# Patient Record
Sex: FEMALE | Race: WHITE | Employment: FULL TIME | URBAN - NONMETROPOLITAN AREA
[De-identification: names, ages, dates, MRNs, and addresses within clinical notes are randomized per-mention and may not be internally consistent; named-entity substitution may affect disease eponyms.]

---

## 2021-05-24 ENCOUNTER — OFFICE VISIT (OUTPATIENT)
Dept: FAMILY MEDICINE CLINIC | Facility: CLINIC | Age: 31
End: 2021-05-24
Payer: COMMERCIAL

## 2021-05-24 VITALS
HEIGHT: 66 IN | OXYGEN SATURATION: 96 % | TEMPERATURE: 98 F | HEART RATE: 82 BPM | DIASTOLIC BLOOD PRESSURE: 72 MMHG | BODY MASS INDEX: 30.22 KG/M2 | SYSTOLIC BLOOD PRESSURE: 118 MMHG | WEIGHT: 188 LBS

## 2021-05-24 DIAGNOSIS — M25.562 CHRONIC PAIN OF BOTH KNEES: ICD-10-CM

## 2021-05-24 DIAGNOSIS — G44.209 TENSION HEADACHE: ICD-10-CM

## 2021-05-24 DIAGNOSIS — Z12.31 BREAST CANCER SCREENING BY MAMMOGRAM: ICD-10-CM

## 2021-05-24 DIAGNOSIS — Z97.5 IUD (INTRAUTERINE DEVICE) IN PLACE: ICD-10-CM

## 2021-05-24 DIAGNOSIS — G89.29 CHRONIC PAIN OF BOTH KNEES: ICD-10-CM

## 2021-05-24 DIAGNOSIS — M25.561 CHRONIC PAIN OF BOTH KNEES: ICD-10-CM

## 2021-05-24 DIAGNOSIS — Z01.419 ENCOUNTER FOR ANNUAL ROUTINE GYNECOLOGICAL EXAMINATION: Primary | ICD-10-CM

## 2021-05-24 DIAGNOSIS — Z86.16 HISTORY OF 2019 NOVEL CORONAVIRUS DISEASE (COVID-19): ICD-10-CM

## 2021-05-24 DIAGNOSIS — J01.40 ACUTE NON-RECURRENT PANSINUSITIS: ICD-10-CM

## 2021-05-24 DIAGNOSIS — Z00.00 GENERAL MEDICAL EXAM: ICD-10-CM

## 2021-05-24 PROCEDURE — 3074F SYST BP LT 130 MM HG: CPT | Performed by: NURSE PRACTITIONER

## 2021-05-24 PROCEDURE — 3078F DIAST BP <80 MM HG: CPT | Performed by: NURSE PRACTITIONER

## 2021-05-24 PROCEDURE — 99385 PREV VISIT NEW AGE 18-39: CPT | Performed by: NURSE PRACTITIONER

## 2021-05-24 PROCEDURE — 87624 HPV HI-RISK TYP POOLED RSLT: CPT | Performed by: NURSE PRACTITIONER

## 2021-05-24 PROCEDURE — 3008F BODY MASS INDEX DOCD: CPT | Performed by: NURSE PRACTITIONER

## 2021-05-24 PROCEDURE — 88175 CYTOPATH C/V AUTO FLUID REDO: CPT | Performed by: NURSE PRACTITIONER

## 2021-05-24 PROCEDURE — 87625 HPV TYPES 16 & 18 ONLY: CPT | Performed by: NURSE PRACTITIONER

## 2021-05-24 RX ORDER — AZITHROMYCIN 250 MG/1
TABLET, FILM COATED ORAL
Qty: 6 TABLET | Refills: 0 | Status: SHIPPED | OUTPATIENT
Start: 2021-05-24 | End: 2021-05-29

## 2021-05-24 NOTE — PROGRESS NOTES
HPI: HPI   Patient is here to establish care and for physical. Overdue for pap. Has Paragard IUD. Regular, monthly periods. They are on the heavier side. She had COVID in November 2020. Has been experiencing daily tension headaches since that time.  Worse watches minimally, started Keto 1 week ago     REVIEW OF SYSTEMS:   Review of Systems   Constitutional: Negative for appetite change, chills, fatigue, fever and unexpected weight change.    HENT: Positive for congestion, postnasal drip, rhinorrhea, sinus pr rhinorrhea present. Rhinorrhea is purulent. Right Turbinates: Swollen. Left Turbinates: Swollen. Mouth/Throat:      Mouth: Mucous membranes are moist.      Pharynx: Oropharynx is clear.    Eyes:      Conjunctiva/sclera: Conjunctivae normal. XR KNEE ROUTINE (3 VIEWS), RIGHT (CPT=73562);  Future    Tension headache  -     NEURO - INTERNAL    History of 2019 novel coronavirus disease (COVID-19)  -     NEURO - INTERNAL

## 2021-05-25 ENCOUNTER — HOSPITAL ENCOUNTER (OUTPATIENT)
Dept: GENERAL RADIOLOGY | Age: 31
Discharge: HOME | End: 2021-05-25
Attending: NURSE PRACTITIONER
Payer: COMMERCIAL

## 2021-05-25 ENCOUNTER — LABORATORY ENCOUNTER (OUTPATIENT)
Dept: LAB | Age: 31
End: 2021-05-25
Attending: FAMILY MEDICINE
Payer: COMMERCIAL

## 2021-05-25 DIAGNOSIS — G89.29 CHRONIC PAIN OF BOTH KNEES: ICD-10-CM

## 2021-05-25 DIAGNOSIS — M25.562 CHRONIC PAIN OF BOTH KNEES: ICD-10-CM

## 2021-05-25 DIAGNOSIS — M25.561 CHRONIC PAIN OF BOTH KNEES: ICD-10-CM

## 2021-05-25 PROCEDURE — 73562 X-RAY EXAM OF KNEE 3: CPT | Performed by: NURSE PRACTITIONER

## 2021-07-26 ENCOUNTER — OFFICE VISIT (OUTPATIENT)
Dept: FAMILY MEDICINE CLINIC | Facility: CLINIC | Age: 31
End: 2021-07-26

## 2021-07-26 VITALS
WEIGHT: 183.5 LBS | HEIGHT: 66 IN | BODY MASS INDEX: 29.49 KG/M2 | OXYGEN SATURATION: 97 % | DIASTOLIC BLOOD PRESSURE: 70 MMHG | HEART RATE: 107 BPM | RESPIRATION RATE: 16 BRPM | TEMPERATURE: 99 F | SYSTOLIC BLOOD PRESSURE: 124 MMHG

## 2021-07-26 DIAGNOSIS — R05.9 COUGH: ICD-10-CM

## 2021-07-26 DIAGNOSIS — R06.2 WHEEZING: Primary | ICD-10-CM

## 2021-07-26 PROCEDURE — 3078F DIAST BP <80 MM HG: CPT | Performed by: INTERNAL MEDICINE

## 2021-07-26 PROCEDURE — 3008F BODY MASS INDEX DOCD: CPT | Performed by: INTERNAL MEDICINE

## 2021-07-26 PROCEDURE — 99214 OFFICE O/P EST MOD 30 MIN: CPT | Performed by: INTERNAL MEDICINE

## 2021-07-26 PROCEDURE — 3074F SYST BP LT 130 MM HG: CPT | Performed by: INTERNAL MEDICINE

## 2021-07-26 RX ORDER — ALBUTEROL SULFATE 90 UG/1
1 AEROSOL, METERED RESPIRATORY (INHALATION) EVERY 6 HOURS PRN
Qty: 1 EACH | Refills: 0 | Status: SHIPPED | OUTPATIENT
Start: 2021-07-26 | End: 2021-08-25

## 2021-07-26 RX ORDER — PREDNISONE 20 MG/1
40 TABLET ORAL DAILY
Qty: 14 TABLET | Refills: 0 | Status: SHIPPED | OUTPATIENT
Start: 2021-07-26 | End: 2021-08-02

## 2021-07-26 NOTE — PROGRESS NOTES
HPI:   Lukas Franklin is a 33year old female who presents for upper respiratory symptoms for  3  days. Patient reports dry cough, wheezing.      Current Outpatient Medications   Medication Sig Dispense Refill   • Cetirizine HCl 10 MG Oral Cap Take 1 cap Emmanuel Mariano is a 33year old female who presents with Viral Syndrome. PLAN: rapid negative, tested with PCR and started steroid and albuterol for wheezing. .  The patient indicates understanding of these issues and agrees to the plan.   The patient is asked to r

## 2021-07-27 ENCOUNTER — TELEPHONE (OUTPATIENT)
Dept: FAMILY MEDICINE CLINIC | Facility: CLINIC | Age: 31
End: 2021-07-27

## 2021-07-27 LAB — SARS-COV-2 RNA RESP QL NAA+PROBE: NOT DETECTED

## 2021-07-27 NOTE — TELEPHONE ENCOUNTER
Reviewed by Dr. Ermelinda Reynolds, patient advised that it was Dr. Lucía Coreas oversight, if Nuno Cain is not feeling better tomorrow and the covid test is negative she should be seen here with Dr. Ermelinda Reynolds, no charge.  Also advised to start the albuterol inhaler, it was order

## 2021-07-27 NOTE — TELEPHONE ENCOUNTER
PT. CALLING UPSET THAT SHE FEELS SHE WAS RUSHED YESTERDAY IN THE RESP. CLINIC. SHE SAID SHE DIDN'T GET A DIAGNOSIS AND NEVER GOT A ANTIBIOTIC CALLED IN. SHE IS ASKING FOR DR. NAVARRO NURSE TO PLEASE CALL HER BACK TODAY.

## 2021-07-27 NOTE — TELEPHONE ENCOUNTER
Still coughing, wheezing really bad, couldn't catch her breath. Had a fever yesterday was 101.9. States she had pneumonia in the past any fairly certain she has it again. Also reports she had a negative rapid covid test on Sunday.

## 2021-07-28 ENCOUNTER — TELEPHONE (OUTPATIENT)
Dept: FAMILY MEDICINE CLINIC | Facility: CLINIC | Age: 31
End: 2021-07-28

## 2021-07-28 ENCOUNTER — OFFICE VISIT (OUTPATIENT)
Dept: FAMILY MEDICINE CLINIC | Facility: CLINIC | Age: 31
End: 2021-07-28

## 2021-07-28 ENCOUNTER — HOSPITAL ENCOUNTER (OUTPATIENT)
Dept: GENERAL RADIOLOGY | Age: 31
Discharge: HOME | End: 2021-07-28
Attending: INTERNAL MEDICINE

## 2021-07-28 VITALS
WEIGHT: 183 LBS | HEIGHT: 66 IN | BODY MASS INDEX: 29.41 KG/M2 | SYSTOLIC BLOOD PRESSURE: 128 MMHG | DIASTOLIC BLOOD PRESSURE: 72 MMHG | OXYGEN SATURATION: 96 % | HEART RATE: 98 BPM | TEMPERATURE: 99 F | RESPIRATION RATE: 18 BRPM

## 2021-07-28 DIAGNOSIS — R05.9 COUGH: Primary | ICD-10-CM

## 2021-07-28 DIAGNOSIS — R05.9 COUGH: ICD-10-CM

## 2021-07-28 PROCEDURE — 3074F SYST BP LT 130 MM HG: CPT | Performed by: INTERNAL MEDICINE

## 2021-07-28 PROCEDURE — 3008F BODY MASS INDEX DOCD: CPT | Performed by: INTERNAL MEDICINE

## 2021-07-28 PROCEDURE — 71046 X-RAY EXAM CHEST 2 VIEWS: CPT | Performed by: INTERNAL MEDICINE

## 2021-07-28 PROCEDURE — 3078F DIAST BP <80 MM HG: CPT | Performed by: INTERNAL MEDICINE

## 2021-07-28 RX ORDER — AMOXICILLIN AND CLAVULANATE POTASSIUM 875; 125 MG/1; MG/1
1 TABLET, FILM COATED ORAL 2 TIMES DAILY
Qty: 30 TABLET | Refills: 0 | Status: SHIPPED | OUTPATIENT
Start: 2021-07-28 | End: 2021-07-28

## 2021-07-28 RX ORDER — AMOXICILLIN AND CLAVULANATE POTASSIUM 875; 125 MG/1; MG/1
1 TABLET, FILM COATED ORAL 2 TIMES DAILY
Qty: 20 TABLET | Refills: 0 | Status: SHIPPED | OUTPATIENT
Start: 2021-07-28 | End: 2021-08-07

## 2021-07-28 NOTE — TELEPHONE ENCOUNTER
PLEASE CALL RUEL TO CLAIRFY THE ANTIBIOTIC THAT WAS JUST SENT OVER, IT SAYS TO DISPENSE 30 BUT PT ONLY NEEDS 20 ACCORDING TO PHARMACY

## 2021-07-29 NOTE — PROGRESS NOTES
HPI:   Ira Mason is a 33year old female who presents for upper respiratory symptoms for  6  days. Patient reports low grade fever, dry cough, cough is keeping pt up at night, wheezing, tested neg for COVID by rapid and PCR, needs CXR.     Current O developed, well nourished,in no apparent distress  SKIN: no rashes,no suspicious lesions  EYES:PERRLA, EOMI, normal optic disk,conjunctiva are clear  HEENT: atraumatic, normocephalic,ears and throat are clear  NECK: supple,no adenopathy,no bruits  LUNGS: c

## 2021-10-19 ENCOUNTER — TELEPHONE (OUTPATIENT)
Dept: FAMILY MEDICINE CLINIC | Facility: CLINIC | Age: 31
End: 2021-10-19

## 2021-12-14 ENCOUNTER — OFFICE VISIT (OUTPATIENT)
Dept: FAMILY MEDICINE CLINIC | Facility: CLINIC | Age: 31
End: 2021-12-14

## 2021-12-14 VITALS
HEIGHT: 66 IN | SYSTOLIC BLOOD PRESSURE: 110 MMHG | HEART RATE: 86 BPM | TEMPERATURE: 97 F | WEIGHT: 161 LBS | DIASTOLIC BLOOD PRESSURE: 70 MMHG | OXYGEN SATURATION: 99 % | BODY MASS INDEX: 25.88 KG/M2 | RESPIRATION RATE: 18 BRPM

## 2021-12-14 DIAGNOSIS — J01.00 ACUTE NON-RECURRENT MAXILLARY SINUSITIS: Primary | ICD-10-CM

## 2021-12-14 PROCEDURE — 3078F DIAST BP <80 MM HG: CPT | Performed by: FAMILY MEDICINE

## 2021-12-14 PROCEDURE — 3074F SYST BP LT 130 MM HG: CPT | Performed by: FAMILY MEDICINE

## 2021-12-14 PROCEDURE — 3008F BODY MASS INDEX DOCD: CPT | Performed by: FAMILY MEDICINE

## 2021-12-14 PROCEDURE — 99213 OFFICE O/P EST LOW 20 MIN: CPT | Performed by: FAMILY MEDICINE

## 2021-12-14 RX ORDER — AMOXICILLIN AND CLAVULANATE POTASSIUM 875; 125 MG/1; MG/1
1 TABLET, FILM COATED ORAL 2 TIMES DAILY
Qty: 20 TABLET | Refills: 0 | Status: SHIPPED | OUTPATIENT
Start: 2021-12-14 | End: 2021-12-24

## 2021-12-14 NOTE — PROGRESS NOTES
Alen Rausch is a 33year old female. Patient presents with:  Sinus Problem: sick clinic      HPI:   Patient complains of 2-month history of sinus congestion, postnasal drip, ear pressure. No fever chills or sweats.   No shortness of breath or wheezi developed, well nourished,in no apparent distress  SKIN: no rashes,no suspicious lesions  HEENT: atraumatic, normocephalic, R TM normal, L TM normal, Pharynx with PND  NECK: supple, no cervical adenopathy  LUNGS: clear to auscultation  CARDIO: RRR without

## 2022-09-13 ENCOUNTER — OFFICE VISIT (OUTPATIENT)
Dept: FAMILY MEDICINE CLINIC | Facility: CLINIC | Age: 32
End: 2022-09-13

## 2022-09-13 VITALS
HEART RATE: 89 BPM | BODY MASS INDEX: 28.13 KG/M2 | SYSTOLIC BLOOD PRESSURE: 126 MMHG | WEIGHT: 175 LBS | OXYGEN SATURATION: 98 % | TEMPERATURE: 98 F | DIASTOLIC BLOOD PRESSURE: 76 MMHG | RESPIRATION RATE: 18 BRPM | HEIGHT: 66 IN

## 2022-09-13 DIAGNOSIS — T63.301A SPIDER BITE WOUND, ACCIDENTAL OR UNINTENTIONAL, INITIAL ENCOUNTER: Primary | ICD-10-CM

## 2022-09-13 DIAGNOSIS — L03.116 CELLULITIS OF LEFT LOWER EXTREMITY: ICD-10-CM

## 2022-09-13 PROCEDURE — 99213 OFFICE O/P EST LOW 20 MIN: CPT | Performed by: FAMILY MEDICINE

## 2022-09-13 RX ORDER — CLINDAMYCIN HYDROCHLORIDE 300 MG/1
300 CAPSULE ORAL 3 TIMES DAILY
Qty: 21 CAPSULE | Refills: 0 | Status: SHIPPED | OUTPATIENT
Start: 2022-09-13 | End: 2022-09-20

## 2022-09-13 RX ORDER — MUPIROCIN CALCIUM 20 MG/G
CREAM TOPICAL
Qty: 15 G | Refills: 1 | Status: SHIPPED | OUTPATIENT
Start: 2022-09-13 | End: 2022-09-13

## 2023-11-12 ENCOUNTER — E-VISIT (OUTPATIENT)
Dept: TELEHEALTH | Age: 33
End: 2023-11-12
Payer: COMMERCIAL

## 2023-11-12 DIAGNOSIS — R09.81 SINUS CONGESTION: ICD-10-CM

## 2023-11-12 DIAGNOSIS — R05.1 ACUTE COUGH: ICD-10-CM

## 2023-11-12 DIAGNOSIS — R06.2 WHEEZING: Primary | ICD-10-CM

## 2023-11-12 NOTE — PROGRESS NOTES
Devora Mack is a 26year old female submitting e-visit for cough, sinus congestion, wheezing. HPI:   See answers to questionnaire and [employer] message exchange  Sx began > 2 weeks ago    Current Outpatient Medications   Medication Sig Dispense Refill    Cetirizine HCl 10 MG Oral Cap Take 1 capsule by mouth daily. Acetaminophen-Caffeine (TENSION HEADACHE RELIEF OR) Take by mouth. PARAGARD INTRAUTERINE COPPER IU by Intrauterine route. Past Medical History:   Diagnosis Date    Bicornuate Uterus     Ovarian cyst       Past Surgical History:   Procedure Laterality Date          COLPOSCOPY,BX CERVIX/ENDOCERV CURR  2011    ROBBY II    LEEP  2011    ROBBY II      Family History   Problem Relation Age of Onset    Arrhythmia Maternal Grandmother       Social History:  Social History     Socioeconomic History    Marital status:    Tobacco Use    Smoking status: Former     Packs/day: 0.00     Years: 1.00     Additional pack years: 0.00     Total pack years: 0.00     Types: Cigarettes    Smokeless tobacco: Never   Substance and Sexual Activity    Alcohol use: No    Drug use: No         ASSESSMENT AND PLAN:     Encounter Diagnoses   Name Primary? Wheezing Yes    Sinus congestion     Acute cough        After reviewing questionnaire, a higher level of care was recommended to pt d/t limitations of telehealth.    Referred to [facility] for further eval due to sx > 2 weeks; reports cough, wheezing, congestion; extreme pressure behind eyes causing eyes to be red and irritated  Needs physical exam to guide treatment       Duration of  the service:  5 minutes    Refer to [employer] exchange for specific patient instructions

## 2023-11-19 ENCOUNTER — OFFICE VISIT (OUTPATIENT)
Dept: FAMILY MEDICINE CLINIC | Facility: CLINIC | Age: 33
End: 2023-11-19
Payer: COMMERCIAL

## 2023-11-19 VITALS
RESPIRATION RATE: 18 BRPM | HEART RATE: 76 BPM | TEMPERATURE: 97 F | SYSTOLIC BLOOD PRESSURE: 118 MMHG | HEIGHT: 66 IN | BODY MASS INDEX: 27.32 KG/M2 | DIASTOLIC BLOOD PRESSURE: 82 MMHG | WEIGHT: 170 LBS | OXYGEN SATURATION: 98 %

## 2023-11-19 DIAGNOSIS — J01.00 ACUTE NON-RECURRENT MAXILLARY SINUSITIS: Primary | ICD-10-CM

## 2023-11-19 DIAGNOSIS — J40 BRONCHITIS: ICD-10-CM

## 2023-11-19 PROCEDURE — 3074F SYST BP LT 130 MM HG: CPT | Performed by: PHYSICIAN ASSISTANT

## 2023-11-19 PROCEDURE — 3008F BODY MASS INDEX DOCD: CPT | Performed by: PHYSICIAN ASSISTANT

## 2023-11-19 PROCEDURE — 99213 OFFICE O/P EST LOW 20 MIN: CPT | Performed by: PHYSICIAN ASSISTANT

## 2023-11-19 PROCEDURE — 3079F DIAST BP 80-89 MM HG: CPT | Performed by: PHYSICIAN ASSISTANT

## 2023-11-19 RX ORDER — AMOXICILLIN AND CLAVULANATE POTASSIUM 875; 125 MG/1; MG/1
1 TABLET, FILM COATED ORAL 2 TIMES DAILY
Qty: 20 TABLET | Refills: 0 | Status: SHIPPED | OUTPATIENT
Start: 2023-11-19 | End: 2023-11-29

## 2023-11-19 RX ORDER — ALBUTEROL SULFATE 90 UG/1
2 AEROSOL, METERED RESPIRATORY (INHALATION) EVERY 4 HOURS PRN
Qty: 1 EACH | Refills: 0 | Status: SHIPPED | OUTPATIENT
Start: 2023-11-19

## 2023-11-19 NOTE — PATIENT INSTRUCTIONS
Augmentin 875 mg twice daily for 10 days. Albuterol inhaler 2 puffs every 4 to 6 hours as needed. Encourage fluids, humidifier/vaporizor at bedside, elevate head of bed (sleep with extra pillow), vapor rub to chest, steam therapy if no fever, warm compresses for sinus pressure if no fever, salt water gargles for sore throat, lozenges for sore throat, may try over the counter saline nasal spray or irrigation kit (use distilled water with irrigation kit) for sinus pressure/congestion, get plenty of rest.    Follow up with your primary care provider for recheck. Go to ER or immediate care with new/worsening symptoms at any time.

## 2024-03-30 ENCOUNTER — OFFICE VISIT (OUTPATIENT)
Dept: FAMILY MEDICINE CLINIC | Facility: CLINIC | Age: 34
End: 2024-03-30
Payer: COMMERCIAL

## 2024-03-30 VITALS
HEIGHT: 66 IN | TEMPERATURE: 98 F | SYSTOLIC BLOOD PRESSURE: 114 MMHG | DIASTOLIC BLOOD PRESSURE: 76 MMHG | HEART RATE: 83 BPM | BODY MASS INDEX: 28.13 KG/M2 | WEIGHT: 175 LBS | RESPIRATION RATE: 16 BRPM | OXYGEN SATURATION: 98 %

## 2024-03-30 DIAGNOSIS — J01.00 ACUTE NON-RECURRENT MAXILLARY SINUSITIS: Primary | ICD-10-CM

## 2024-03-30 PROCEDURE — 3074F SYST BP LT 130 MM HG: CPT | Performed by: NURSE PRACTITIONER

## 2024-03-30 PROCEDURE — 3008F BODY MASS INDEX DOCD: CPT | Performed by: NURSE PRACTITIONER

## 2024-03-30 PROCEDURE — 99213 OFFICE O/P EST LOW 20 MIN: CPT | Performed by: NURSE PRACTITIONER

## 2024-03-30 PROCEDURE — 3078F DIAST BP <80 MM HG: CPT | Performed by: NURSE PRACTITIONER

## 2024-03-30 RX ORDER — AMOXICILLIN AND CLAVULANATE POTASSIUM 875; 125 MG/1; MG/1
1 TABLET, FILM COATED ORAL 2 TIMES DAILY
Qty: 20 TABLET | Refills: 0 | Status: SHIPPED | OUTPATIENT
Start: 2024-03-30 | End: 2024-04-09

## 2024-03-30 NOTE — PATIENT INSTRUCTIONS
1. Rest. Drink plenty of fluids.     2. Supportive care as discussed. Flonase and otc antihistamine as directed.    3. If symptoms not improving in 2-3 days then start augmentin as prescribed.    4. Follow up with PMD in 4-5 days for re-eval. Go to the emergency department immediately if symptoms worsen, change, you develop chest discomfort, wheezing, shortness of breath, or if you have any concerns.

## 2024-03-30 NOTE — PROGRESS NOTES
CHIEF COMPLAINT:     Chief Complaint   Patient presents with    Cold     Sinus congestion,  OTC Musinex       HPI:   Maria Esther Cortez is a 34 year old female who presents for concerns of a sinus infection/ Patient reports sinus congstion/pressure and drainage this past week.  Treating symptoms with otc meds.   Tolerates PO well at home. No n/v/d.  Denies any other aggravating or relieving factors at home. Denies any other treatment attempts prior to arrival.       Current Outpatient Medications   Medication Sig Dispense Refill    amoxicillin clavulanate 875-125 MG Oral Tab Take 1 tablet by mouth 2 (two) times daily for 10 days. 20 tablet 0    albuterol 108 (90 Base) MCG/ACT Inhalation Aero Soln Inhale 2 puffs into the lungs every 4 (four) hours as needed for Wheezing or Shortness of Breath. 1 each 0    Cetirizine HCl 10 MG Oral Cap Take 1 capsule by mouth daily.      Acetaminophen-Caffeine (TENSION HEADACHE RELIEF OR) Take by mouth.        PARAGARD INTRAUTERINE COPPER IU by Intrauterine route.        Past Medical History:   Diagnosis Date    Bicornuate Uterus     Ovarian cyst       Past Surgical History:   Procedure Laterality Date          COLPOSCOPY,BX CERVIX/ENDOCERV CURR  2011    ROBBY II    LEEP  2011    ROBBY II         Social History     Socioeconomic History    Marital status:    Tobacco Use    Smoking status: Former     Packs/day: 0.00     Years: 1.00     Additional pack years: 0.00     Total pack years: 0.00     Types: Cigarettes    Smokeless tobacco: Never   Substance and Sexual Activity    Alcohol use: No    Drug use: No         REVIEW OF SYSTEMS:   GENERAL: Denies fever. Notes good appetite  SKIN: no rashes or abnormal skin lesions  HEENT: Denies sore throat, + sinus symptoms, Denies ear pain  LUNGS: + nonproductive cough, denies shortness of breath or wheezing,   CARDIOVASCULAR: denies chest pain or palpitations   GI: denies N/V/C or abdominal pain  NEURO: Denies  headaches    EXAM:   /76   Pulse 83   Temp 98.1 °F (36.7 °C)   Resp 16   Ht 5' 6\" (1.676 m)   Wt 175 lb (79.4 kg)   LMP 03/15/2024 (Approximate)   SpO2 98%   BMI 28.25 kg/m²   GENERAL: well developed, well nourished,in no apparent distress  SKIN: no rashes,no suspicious lesions  HEAD: atraumatic, normocephalic.    EYES: conjunctiva clear, EOM intact  EARS: TM's intact and without erythema, no bulging, no retraction,no fluid, bony landmarks visualized. No erythema or swelling noted to ear canals or external ears.   NOSE: Nostrils patent, dried yellow nasal discharge, nasal mucosa reddened   THROAT: Oral mucosa pink, moist. Posterior pharynx is mildly erythematous. No exudates. No tonsillar hypertrophy noted.  No trismus. Uvula midline with no swelling. Voice clear/normal. No stridor  NECK: Supple, non-tender  LUNGS: clear to auscultation bilaterally, no rales, wheezes or rhonchi. Breathing is non labored.  CARDIO: RRR without murmur  EXTREMITIES: no cyanosis, clubbing or edema  LYMPH:  No lymphadenopathy.        ASSESSMENT AND PLAN:       ICD-10-CM    1. Acute non-recurrent maxillary sinusitis  J01.00 amoxicillin clavulanate 875-125 MG Oral Tab        Discussed physical exam and hpi with pt. Pt has reassuring physical exam consistent with sinusitis. We discussed viral vs allergy vs bacterial etiologies associated with sinusitis. Pt agreeable to delayed antimicrobial therapy option. Treatment options discussed with patient and explained in detail. We reviewed symptomatic care at home and if symtpoms not improving in 2 days then will start augmentin as prescribed.. The risks, benefits and potential side effects of possible medications were reviewed. Alternatives were discussed. Monitoring parameters and expected course outlined. Patient to call PCP or go to emergency department if symptoms fail to respond as outlined, or worsen in any way. The patient agreed with the plan.       Patient Instructions   1.  Rest. Drink plenty of fluids.     2. Supportive care as discussed. Flonase and otc antihistamine as directed.    3. If symptoms not improving in 2-3 days then start augmentin as prescribed.    4. Follow up with PMD in 4-5 days for re-eval. Go to the emergency department immediately if symptoms worsen, change, you develop chest discomfort, wheezing, shortness of breath, or if you have any concerns.        The patient indicates understanding of these issues and agrees to the plan.

## 2024-11-06 ENCOUNTER — PATIENT OUTREACH (OUTPATIENT)
Dept: CASE MANAGEMENT | Age: 34
End: 2024-11-06

## 2024-11-06 NOTE — PROCEDURES
The office order for PCP removal request is Approved and finalized on November 6, 2024.    Removed Michael Sigala DO as the patient's Primary Care Physician

## 2025-03-06 ENCOUNTER — E-VISIT (OUTPATIENT)
Dept: TELEHEALTH | Age: 35
End: 2025-03-06

## 2025-03-06 DIAGNOSIS — Z29.9 PROPHYLACTIC MEASURE: ICD-10-CM

## 2025-03-06 DIAGNOSIS — J32.9 VIRAL SINUSITIS: Primary | ICD-10-CM

## 2025-03-06 DIAGNOSIS — B97.89 VIRAL SINUSITIS: Primary | ICD-10-CM

## 2025-03-06 PROCEDURE — 99421 OL DIG E/M SVC 5-10 MIN: CPT | Performed by: PHYSICIAN ASSISTANT

## 2025-03-06 NOTE — PROGRESS NOTES
Maria Esther Cortez is a 35 year old female who initiated e-visit care today.    HPI:   See answers to questionnaire submission     Current Outpatient Medications   Medication Sig Dispense Refill    albuterol 108 (90 Base) MCG/ACT Inhalation Aero Soln Inhale 2 puffs into the lungs every 4 (four) hours as needed for Wheezing or Shortness of Breath. 1 each 0    Cetirizine HCl 10 MG Oral Cap Take 1 capsule by mouth daily.      Acetaminophen-Caffeine (TENSION HEADACHE RELIEF OR) Take by mouth.        PARAGARD INTRAUTERINE COPPER IU by Intrauterine route.        Past Medical History:    Bicornuate Uterus    Ovarian cyst      Past Surgical History:   Procedure Laterality Date          Colposcopy,bx cervix/endocerv curr  2011    ROBBY II    Leep  2011    ROBBY II      Family History   Problem Relation Age of Onset    Arrhythmia Maternal Grandmother       Social History:  Social History     Socioeconomic History    Marital status:    Tobacco Use    Smoking status: Former     Current packs/day: 0.00     Types: Cigarettes    Smokeless tobacco: Never   Substance and Sexual Activity    Alcohol use: No    Drug use: No         ASSESSMENT AND PLAN:       Diagnoses and all orders for this visit:    Viral sinusitis    Prophylactic measure  -     amoxicillin clavulanate 875-125 MG Oral Tab; Take 1 tablet by mouth 2 (two) times daily for 7 days.     Ill for 3 days. Reports year round allergies and sinusitis 1x per year. Explained even with this, she is still in viral phase of illness.  Post-dated rx for Amox-Clav sent to pharmacy for patient to start should she not be improving by day 7.  Detailed comfort care measures provided as well       Duration of  the service:  6 minutes      See [employer] message exchange and Patient Instructions for Comfort Care and patient education.

## 2025-03-07 ENCOUNTER — OFFICE VISIT (OUTPATIENT)
Dept: FAMILY MEDICINE CLINIC | Facility: CLINIC | Age: 35
End: 2025-03-07

## 2025-03-07 VITALS
TEMPERATURE: 98 F | HEART RATE: 84 BPM | RESPIRATION RATE: 18 BRPM | DIASTOLIC BLOOD PRESSURE: 76 MMHG | BODY MASS INDEX: 26.52 KG/M2 | SYSTOLIC BLOOD PRESSURE: 122 MMHG | OXYGEN SATURATION: 97 % | HEIGHT: 66 IN | WEIGHT: 165 LBS

## 2025-03-07 DIAGNOSIS — H65.92 OME (OTITIS MEDIA WITH EFFUSION), LEFT: ICD-10-CM

## 2025-03-07 DIAGNOSIS — H66.91 ACUTE RIGHT OTITIS MEDIA: Primary | ICD-10-CM

## 2025-03-07 PROCEDURE — 99213 OFFICE O/P EST LOW 20 MIN: CPT | Performed by: PHYSICIAN ASSISTANT

## 2025-03-07 RX ORDER — AMOXICILLIN 875 MG/1
875 TABLET, COATED ORAL 2 TIMES DAILY
Qty: 20 TABLET | Refills: 0 | Status: SHIPPED | OUTPATIENT
Start: 2025-03-07 | End: 2025-03-17

## 2025-03-07 NOTE — PROGRESS NOTES
CHIEF COMPLAINT:     Chief Complaint   Patient presents with    Sinus Problem     Sinus,  nasal congestion - Entered by patient  Started    No fevers        HPI:   Maria Esther Cortez is a 35 year old female who presents for upper respiratory symptoms for x 4-5 days.  (+) nasal congestion, ramon ear pain R>L,L ear popping, post nasal drainage, mild cough, purulent rhinorrhea. Denies fever, no chills/sweats, no CP, no SOB/MAYBERRY, no drainage from ears.   OTC Advil Cold/sinus without relief.   Sporadic use of Claritin, has flonase at home which she uses prn.     H/o allergic rhinitis.  No sinus surgeries.     Family members with similar URI sx tx with abx.     Evisit for same yesterday, given post dated rx for augmentin (3/10) if symptoms fail to improve.  Current Outpatient Medications   Medication Sig Dispense Refill    amoxicillin 875 MG Oral Tab Take 1 tablet (875 mg total) by mouth 2 (two) times daily for 10 days. 20 tablet 0    Acetaminophen-Caffeine (TENSION HEADACHE RELIEF OR) Take by mouth.        PARAGARD INTRAUTERINE COPPER IU by Intrauterine route.      [START ON 3/10/2025] amoxicillin clavulanate 875-125 MG Oral Tab Take 1 tablet by mouth 2 (two) times daily for 7 days. (Patient not taking: Reported on 3/7/2025) 14 tablet 0      Past Medical History:    Bicornuate Uterus    Ovarian cyst      Past Surgical History:   Procedure Laterality Date          Colposcopy,bx cervix/endocerv curr  2011    ROBBY II    Leep  2011    ROBBY II         Social History     Socioeconomic History    Marital status:    Tobacco Use    Smoking status: Former     Current packs/day: 0.00     Types: Cigarettes    Smokeless tobacco: Never   Substance and Sexual Activity    Alcohol use: No    Drug use: No         REVIEW OF SYSTEMS:   GENERAL: normal appetite  SKIN: no rashes or abnormal skin lesions  HEENT: See HPI  LUNGS: See HPI  CARDIOVASCULAR: denies chest pain or palpitations   GI: denies N/V/C or abdominal  pain      EXAM:   /76   Pulse 84   Temp 97.6 °F (36.4 °C)   Resp 18   Ht 5' 6\" (1.676 m)   Wt 165 lb (74.8 kg)   LMP 03/07/2025 (Approximate)   SpO2 97%   BMI 26.63 kg/m²   GENERAL: well developed, well nourished,in no apparent distress  SKIN: no rashes,no suspicious lesions  HEAD: atraumatic, normocephalic.    EYES: conjunctiva clear, EOM intact  EARS: TM's R TM injected/bulging, L TM with air fluid levels without injection.   No mastoid ttp.   NOSE: Nostrils patent, clear nasal discharge, nasal mucosa edematous/erythematous with near occlusion on R   THROAT: Oral mucosa pink, moist. Posterior pharynx is mildly injected  erythematous. without exudates, no hypertrophy, uvula midline, clear post nasal drainage with cobblestoning.   NECK: Supple, non-tender  LUNGS: clear to auscultation bilaterally, no wheezes or rhonchi. Breathing is non labored.  CARDIO: RRR without murmur  EXTREMITIES: no cyanosis, clubbing or edema  LYMPH:  shotty ant cervical LAD.       ASSESSMENT AND PLAN:   Maria Esther Cortez is a 35 year old female who presents with upper respiratory symptoms that are consistent with    ASSESSMENT:   Encounter Diagnoses   Name Primary?    Acute right otitis media Yes    OME (otitis media with effusion), left        PLAN  Amoxicillin for R AOM, contacted pharmacy and post dated rx for augmentin canceled.   Recommend resume flonase, OTC pseudoephedrine, sx management.   Reviewed HPI and exam findings suggestive of viral etiology for  sinusitis sx, however given R AOM on exam will tx with abx.         Meds & Refills for this Visit:  Requested Prescriptions     Signed Prescriptions Disp Refills    amoxicillin 875 MG Oral Tab 20 tablet 0     Sig: Take 1 tablet (875 mg total) by mouth 2 (two) times daily for 10 days.     Risks, benefits, and side effects of medication explained and discussed.    The patient indicates understanding of these issues and agrees to the plan.  The patient is asked to f/u with  PCP if sx's persist or worsen.  Patient Instructions    Amoxicillin twice daily for 10 days   Resume flonase:  2 sprays in each nostril daily, or 1 spray in each nostril twice daily.  If you develop a nosebleed, stop medication and restart at half the dose (1 spray in each nostril daily) after you have not had a nosebleed for 2 days.  If nosebleed recurs, then stop medication completely.   Recommend oral antihistamine such as oral Benadryl, Claritin, Allegra, Zyrtec, Xyzal (generic is okay).  Benadryl is dosed multiple times daily and may cause sedation.   Recommend decongestant for runny nose, over the counter pseudoephedrine (Sudafed) obtained from pharmacist with state ID (do not need prescription).   Do not take if you have history of high blood pressure or arrhythmia (irregular or fast heartbeat)      Encourage fluids, humidifier/vaporizor at bedside, elevate head of bed (sleep with extra pillow), vapor rub to chest, steam therapy if no fever, warm compresses for sinus pressure if no fever, salt water gargles for sore throat, lozenges for sore throat, may try over the counter saline nasal spray or irrigation kit (use distilled water with irrigation kit) for sinus pressure/congestion, get plenty of rest.          Follow up with your primary care provider if your symptoms fail to improve and resolve as anticipated    Go to the Immediate Care or Emergency Department in event of new or worsening symptoms at any time     Denisse Rust PA-C

## 2025-03-07 NOTE — PATIENT INSTRUCTIONS
Amoxicillin twice daily for 10 days   Resume flonase:  2 sprays in each nostril daily, or 1 spray in each nostril twice daily.  If you develop a nosebleed, stop medication and restart at half the dose (1 spray in each nostril daily) after you have not had a nosebleed for 2 days.  If nosebleed recurs, then stop medication completely.   Recommend oral antihistamine such as oral Benadryl, Claritin, Allegra, Zyrtec, Xyzal (generic is okay).  Benadryl is dosed multiple times daily and may cause sedation.   Recommend decongestant for runny nose, over the counter pseudoephedrine (Sudafed) obtained from pharmacist with state ID (do not need prescription).   Do not take if you have history of high blood pressure or arrhythmia (irregular or fast heartbeat)      Encourage fluids, humidifier/vaporizor at bedside, elevate head of bed (sleep with extra pillow), vapor rub to chest, steam therapy if no fever, warm compresses for sinus pressure if no fever, salt water gargles for sore throat, lozenges for sore throat, may try over the counter saline nasal spray or irrigation kit (use distilled water with irrigation kit) for sinus pressure/congestion, get plenty of rest.          Follow up with your primary care provider if your symptoms fail to improve and resolve as anticipated    Go to the Immediate Care or Emergency Department in event of new or worsening symptoms at any time

## (undated) NOTE — LETTER
Date: 7/28/2021    Patient Name: Aria Thurman          To Whom it may concern: The above patient was seen at the [facility] for treatment of a medical condition.     This patient should be excused from attending work Sunday 7/25/2021 a